# Patient Record
Sex: FEMALE | Race: WHITE | NOT HISPANIC OR LATINO | Employment: FULL TIME | URBAN - METROPOLITAN AREA
[De-identification: names, ages, dates, MRNs, and addresses within clinical notes are randomized per-mention and may not be internally consistent; named-entity substitution may affect disease eponyms.]

---

## 2022-05-03 ENCOUNTER — HOSPITAL ENCOUNTER (EMERGENCY)
Facility: CLINIC | Age: 42
Discharge: HOME | End: 2022-05-03
Attending: EMERGENCY MEDICINE | Admitting: EMERGENCY MEDICINE
Payer: COMMERCIAL

## 2022-05-03 VITALS
DIASTOLIC BLOOD PRESSURE: 85 MMHG | HEART RATE: 83 BPM | SYSTOLIC BLOOD PRESSURE: 133 MMHG | TEMPERATURE: 97.5 F | WEIGHT: 160 LBS | RESPIRATION RATE: 18 BRPM | OXYGEN SATURATION: 100 % | BODY MASS INDEX: 28.35 KG/M2 | HEIGHT: 63 IN

## 2022-05-03 DIAGNOSIS — Z23 NEED FOR POST EXPOSURE PROPHYLAXIS FOR HEPATITIS B: ICD-10-CM

## 2022-05-03 DIAGNOSIS — Z20.828 NEED FOR POST EXPOSURE PROPHYLAXIS FOR HEPATITIS B: ICD-10-CM
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
## 2022-05-03 PROCEDURE — 86706 HEP B SURFACE ANTIBODY: CPT | Performed by: EMERGENCY MEDICINE

## 2022-05-03 PROCEDURE — 87340 HEPATITIS B SURFACE AG IA: CPT | Performed by: EMERGENCY MEDICINE

## 2022-05-03 PROCEDURE — 96372 THER/PROPH/DIAG INJ SC/IM: CPT | Performed by: EMERGENCY MEDICINE

## 2022-05-03 PROCEDURE — 90746 HEPB VACCINE 3 DOSE ADULT IM: CPT | Performed by: EMERGENCY MEDICINE

## 2022-05-03 PROCEDURE — 87389 HIV-1 AG W/HIV-1&-2 AB AG IA: CPT | Performed by: EMERGENCY MEDICINE

## 2022-05-03 PROCEDURE — G0010 ADMIN HEPATITIS B VACCINE: HCPCS | Performed by: EMERGENCY MEDICINE

## 2022-05-03 PROCEDURE — 99284 EMERGENCY DEPT VISIT MOD MDM: CPT | Mod: 25

## 2022-05-03 PROCEDURE — 90371 HEP B IG IM: CPT | Performed by: EMERGENCY MEDICINE

## 2022-05-03 PROCEDURE — 36415 COLL VENOUS BLD VENIPUNCTURE: CPT | Performed by: EMERGENCY MEDICINE

## 2022-05-03 PROCEDURE — 250N000011 HC RX IP 250 OP 636: Performed by: EMERGENCY MEDICINE

## 2022-05-03 PROCEDURE — 86803 HEPATITIS C AB TEST: CPT | Performed by: EMERGENCY MEDICINE

## 2022-05-03 RX ORDER — EMTRICITABINE AND TENOFOVIR DISOPROXIL FUMARATE 200; 300 MG/1; MG/1
1 TABLET, FILM COATED ORAL DAILY
Qty: 27 TABLET | Refills: 0 | Status: SHIPPED | OUTPATIENT
Start: 2022-05-04 | End: 2023-03-02

## 2022-05-03 RX ORDER — DOLUTEGRAVIR SODIUM 50 MG/1
50 TABLET, FILM COATED ORAL DAILY
Qty: 27 TABLET | Refills: 0 | Status: SHIPPED | OUTPATIENT
Start: 2022-05-04 | End: 2023-03-02

## 2022-05-03 RX ORDER — EMTRICITABINE AND TENOFOVIR DISOPROXIL FUMARATE 200; 300 MG/1; MG/1
1 TABLET, FILM COATED ORAL ONCE
Status: DISCONTINUED | OUTPATIENT
Start: 2022-05-03 | End: 2022-05-03 | Stop reason: HOSPADM

## 2022-05-03 RX ADMIN — HEPATITIS B VACCINE (RECOMBINANT) 20 MCG: 20 INJECTION, SUSPENSION INTRAMUSCULAR at 12:49

## 2022-05-03 RX ADMIN — HEPATITIS B IMMUNE GLOBULIN (HUMAN) 4.36 ML: 1560 LIQUID INTRAMUSCULAR at 12:47

## 2022-05-03 ASSESSMENT — ENCOUNTER SYMPTOMS: WOUND: 1
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
## 2022-05-03 NOTE — ED NOTES
All prescribed medications were dispensed through our discharge pharmacy. Education was discussed with pharmacist. Patient reports no further questions.
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
## 2022-05-03 NOTE — DISCHARGE INSTRUCTIONS
Start taking all of your medications today.  The antibiotics will be for 5 days and the HIV medications will be for 28 days.

## 2022-05-04 ENCOUNTER — TELEPHONE (OUTPATIENT)
Dept: EMERGENCY MEDICINE | Facility: CLINIC | Age: 42
End: 2022-05-04
Payer: COMMERCIAL

## 2022-05-04 LAB
HBV SURFACE AB SERPL IA-ACNC: 2.71 M[IU]/ML
HBV SURFACE AG SERPL QL IA: NONREACTIVE
HCV AB SERPL QL IA: NONREACTIVE
HIV 1+2 AB+HIV1 P24 AG SERPL QL IA: NONREACTIVE
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
## 2022-05-04 NOTE — TELEPHONE ENCOUNTER
[facility] Emergency Department/Urgent Care Lab result notification:     [place] ED lab result protocol used  Blood and body fluid exposure protocol     Reason for call  Notify of lab results, assess symptoms,  review ED providers recommendations/discharge instructions (if necessary) and advise per ED lab result f/u protocol     Lab Result   The following blood and body fluid lab results from a recent exposure were all NEGATIVE (nonreactive) for:     Hepatitis C antibody     Hepatitis B surface antigen (agn)    Hepatitis B surface antibody (opal)  [Note: If vaccinated for Hep B (3 shot series) and result Negative, Patient is instructed to followup with PCP clinic within 72 hours].    HIV Antigen Antibody Combo.       RN Assessment (Patient s current Symptoms), include time called.  [Insert Left message here if message left]  3:06PM: Patient states her hand is bruised and painful, but does not look infected. No spreading rednesss    RN Recommendations/Instructions per [place] ED lab result protocol  Patient notified of lab result and treatment recommendations.    Advised to follow up with her PCP as directed by the ED provider and to speak with the provider about getting her labs rechecked in 6 weeks per protocol  The patient states that she plans to send a message to her provider to discuss her follow up  The patient is comfortable with the information given and has no further questions.      Please Contact your PCP clinic or return to the Emergency department if your:    Symptoms worsen or other concerning symptom's.    PCP follow-up Questions asked: YES       Violet Gonzales RN  [facility] [place]  Emergency Dept Lab Result RN  Ph# [phone]

## 2023-03-02 ENCOUNTER — ANCILLARY PROCEDURE (OUTPATIENT)
Dept: GENERAL RADIOLOGY | Facility: CLINIC | Age: 43
End: 2023-03-02
Attending: NURSE PRACTITIONER
Payer: COMMERCIAL

## 2023-03-02 ENCOUNTER — OFFICE VISIT (OUTPATIENT)
Dept: URGENT CARE | Facility: URGENT CARE | Age: 43
End: 2023-03-02
Payer: COMMERCIAL

## 2023-03-02 VITALS — HEART RATE: 88 BPM | TEMPERATURE: 98.8 F | SYSTOLIC BLOOD PRESSURE: 137 MMHG | DIASTOLIC BLOOD PRESSURE: 78 MMHG

## 2023-03-02 DIAGNOSIS — M79.671 RIGHT FOOT PAIN: ICD-10-CM

## 2023-03-02 DIAGNOSIS — S93.601A FOOT SPRAIN, RIGHT, INITIAL ENCOUNTER: Primary | ICD-10-CM

## 2023-03-02 PROBLEM — D68.2 FACTOR V DEFICIENCY (H): Status: ACTIVE | Noted: 2021-09-02

## 2023-03-02 PROBLEM — I26.99 PE (PULMONARY THROMBOEMBOLISM) (H): Status: ACTIVE | Noted: 2021-09-02

## 2023-03-02 PROBLEM — N92.4 PREMENOPAUSAL MENORRHAGIA: Status: ACTIVE | Noted: 2023-03-02

## 2023-03-02 PROBLEM — I82.409 DVT (DEEP VENOUS THROMBOSIS) (H): Status: ACTIVE | Noted: 2021-09-02

## 2023-03-02 PROCEDURE — 73630 X-RAY EXAM OF FOOT: CPT | Mod: TC | Performed by: RADIOLOGY

## 2023-03-02 PROCEDURE — 99204 OFFICE O/P NEW MOD 45 MIN: CPT | Performed by: NURSE PRACTITIONER

## 2023-03-02 NOTE — PATIENT INSTRUCTIONS
Ice for 20 minutes 3-4 times a day.    Ibuprofen 600mg three times a day with food for 5-7 days.    Wear hard soled shoe.

## 2023-03-02 NOTE — PROGRESS NOTES
Chief Complaint   Patient presents with     Urgent Care     Present for on/off swelling with pain of right foot that has become worse in the last 1.5 weeks         ICD-10-CM    1. Foot sprain, right, initial encounter  S93.601A       2. Right foot pain  M79.671 XR Foot Right G/E 3 Views      Patient will wrap with Ace bandage she has at home and wear a hard soled shoe until symptoms are feeling better.  Tylenol as needed for pain.  Elevate foot when able.  Patient declined to be off work for a few days.  If symptoms fail to improve in a couple weeks she is advised to be evaluated again.     Xray - Reviewed and interpreted by me.  Right foot x-ray shows no acute fractures or dislocations.    Subjective     Isis Nice is an 42 year old female who presents to clinic today for pain in the right foot for a couple of months.  Pain has been worsening over the last 10 days.  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  She does wear hard soled shoes at work.  No previous injury to this foot.  No trauma.      Objective    /78 (BP Location: Left arm, Patient Position: Sitting, Cuff Size: Adult Regular)   Pulse 88   Temp 98.8  F (37.1  C) (Tympanic)   Nurses notes and VS have been reviewed.    Physical Exam       GENERAL APPEARANCE: healthy appearing, alert     MS: extremities normal- no gross deformities noted; normal muscle tone, except for tenderness over the right fourth and fifth metatarsals.  She does have full range of motion of the foot and there is no ecchymosis or swelling..     SKIN: no suspicious lesions or rashes     NEURO: Normal strength and tone, mentation intact and speech normal    Patient Instructions   Ice for 20 minutes 3-4 times a day.    Ibuprofen 600mg three times a day with food for 5-7 days.    Wear hard soled shoe.      Jessica Dickerson APRN, CNP  [facility] Provider    The use of Dragon/[employer] dictation services may have been used to construct the  content in this note; any grammatical or spelling errors are non-intentional. Please contact the author of this note directly if you are in need of any clarification.    Screening for STD (sexually transmitted disease)

## 2023-05-08 ENCOUNTER — HEALTH MAINTENANCE LETTER (OUTPATIENT)
Age: 43
End: 2023-05-08

## 2024-05-11 ENCOUNTER — HEALTH MAINTENANCE LETTER (OUTPATIENT)
Age: 44
End: 2024-05-11

## 2025-05-17 ENCOUNTER — HEALTH MAINTENANCE LETTER (OUTPATIENT)
Age: 45
End: 2025-05-17